# Patient Record
Sex: FEMALE | Race: WHITE | Employment: FULL TIME | URBAN - NONMETROPOLITAN AREA
[De-identification: names, ages, dates, MRNs, and addresses within clinical notes are randomized per-mention and may not be internally consistent; named-entity substitution may affect disease eponyms.]

---

## 2018-05-27 ENCOUNTER — HOSPITAL ENCOUNTER (EMERGENCY)
Facility: HOSPITAL | Age: 63
Discharge: HOME | End: 2018-05-27
Attending: PHYSICIAN ASSISTANT | Admitting: PHYSICIAN ASSISTANT
Payer: COMMERCIAL

## 2018-05-27 VITALS
TEMPERATURE: 97.6 F | RESPIRATION RATE: 16 BRPM | OXYGEN SATURATION: 96 % | DIASTOLIC BLOOD PRESSURE: 89 MMHG | HEIGHT: 62 IN | SYSTOLIC BLOOD PRESSURE: 147 MMHG

## 2018-05-27 DIAGNOSIS — S81.811A LACERATION OF RIGHT LOWER EXTREMITY, INITIAL ENCOUNTER: ICD-10-CM

## 2018-05-27 PROCEDURE — 40000268 ZZH STATISTIC NO CHARGES

## 2018-05-27 PROCEDURE — 12002 RPR S/N/AX/GEN/TRNK2.6-7.5CM: CPT | Performed by: PHYSICIAN ASSISTANT

## 2018-05-27 PROCEDURE — 12002 RPR S/N/AX/GEN/TRNK2.6-7.5CM: CPT

## 2018-05-27 RX ORDER — HYDROCHLOROTHIAZIDE 12.5 MG/1
12.5 CAPSULE ORAL DAILY
COMMUNITY

## 2018-05-27 RX ORDER — CEPHALEXIN 500 MG/1
500 CAPSULE ORAL 3 TIMES DAILY
Qty: 27 CAPSULE | Refills: 0 | Status: SHIPPED | OUTPATIENT
Start: 2018-05-27 | End: 2018-06-05

## 2018-05-27 RX ORDER — CEPHALEXIN 500 MG/1
500 CAPSULE ORAL ONCE
Status: COMPLETED | OUTPATIENT
Start: 2018-05-27 | End: 2018-05-27

## 2018-05-27 RX ORDER — TAMOXIFEN CITRATE 10 MG/1
10 TABLET ORAL 2 TIMES DAILY
COMMUNITY

## 2018-05-27 RX ADMIN — CEPHALEXIN 500 MG: 500 CAPSULE ORAL at 21:26

## 2018-05-27 ASSESSMENT — ENCOUNTER SYMPTOMS
WOUND: 1
CONSTITUTIONAL NEGATIVE: 1
NEUROLOGICAL NEGATIVE: 1
PSYCHIATRIC NEGATIVE: 1
CARDIOVASCULAR NEGATIVE: 1

## 2018-05-27 NOTE — ED AVS SNAPSHOT
[facility]    [address]    [facility] [address]    Phone:  [phone]                                       Sunitha Larkin   MRN: [id number]    Department:  [facility]   Date of Visit:  5/27/2018           After Visit Summary Signature Page     I have received my discharge instructions, and my questions have been answered. I have discussed any challenges I see with this plan with the nurse or doctor.    ..........................................................................................................................................  Patient/Patient Representative Signature      ..........................................................................................................................................  Patient Representative Print Name and Relationship to Patient    ..................................................               ................................................  Date                                            Time    ..........................................................................................................................................  Reviewed by Signature/Title    ...................................................              ..............................................  Date                                                            Time

## 2018-05-27 NOTE — ED AVS SNAPSHOT
[facility]    [address]    [facility] [address]    Phone:  [phone]                                       Sunitha Slade   MRN: [id number]    Department:  [facility]   Date of Visit:  5/27/2018           Patient Information     Date Of Birth          1955        Your diagnoses for this visit were:     Laceration of right lower extremity, initial encounter 12 sutures       You were seen by Shanita Nath PA.      Follow-up Information     Follow up with Cathy Johnson MD In 10 days.    Specialty:  Family Practice    Why:  For suture removal, sooner if redness/swelling develop    Contact information:    XXX NO INFO FOUND XXX  XXX  [place] [address]          Follow up with [facility].    Specialty:  EMERGENCY MEDICINE    Why:  If fever/further concerns develop    Contact information:    [address] [place] [id number] [phone]    Additional information:    From [place]: Take [address]. Turn left at [address]. Turn left at the first stoplight on [address]. At the first stop sign, take a right onto [address]. Take a left into the parking lot and continue through until you reach the North enterance of the building.       From [place]: Take [address]. Take the [address] ramp towards [place]. Turn left onto [address]. Take a slight right onto [address]. Turn left at the first stoplight on [address]. At the first stop sign, take a right onto [address]. Take a left into the parking lot and continue through until you reach the North enterance of the building.       From [place]: Take [address]. Take a right at [address]. At the first stop sign, take a right onto [address]. Take a left into the parking lot and continue through until you reach the North enterance of the building.       Discharge References/Attachments     LACERATION, ALL  "(ENGLISH)    WOUND CARE (ENGLISH)         Review of your medicines      START taking        Dose / Directions Last dose taken    cephALEXin 500 MG capsule   Commonly known as:  KEFLEX   Dose:  500 mg   Quantity:  27 capsule        Take 1 capsule (500 mg) by mouth 3 times daily for 9 days   Refills:  0          Our records show that you are taking the medicines listed below. If these are incorrect, please call your family doctor or clinic.        Dose / Directions Last dose taken    hydrochlorothiazide 12.5 MG capsule   Commonly known as:  MICROZIDE   Dose:  12.5 mg        Take 12.5 mg by mouth daily   Refills:  0        tamoxifen 10 MG tablet   Commonly known as:  NOLVADEX   Dose:  10 mg        Take 10 mg by mouth 2 times daily   Refills:  0                Prescriptions were sent or printed at these locations (1 Prescription)                   [id number] - [place], [place] - [address] AT [facility] [address]   [address], [place] [address]    Telephone:  [phone]   Fax:  [phone]   Hours:                  E-Prescribed (1 of 1)         cephALEXin (KEFLEX) 500 MG capsule                Orders Needing Specimen Collection     None      Pending Results     No orders found from 5/25/2018 to 5/28/2018.            Pending Culture Results     No orders found from 5/25/2018 to 5/28/2018.            Thank you for choosing [facility]       Thank you for choosing [facility] for your care. Our goal is always to provide you with excellent care. Hearing back from our patients is one way we can continue to improve our services. Please take a few minutes to complete the written survey that you may receive in the mail after you visit with us. Thank you!        [facility] Information     [facility] lets you send messages to your doctor, view your test results, renew your prescriptions, schedule appointments and more. To sign up, go to [email or web address] . Click on \"Log in\" on the left side of the screen, " "which will take you to the Welcome page. Then click on \"Sign up Now\" on the right side of the page.     You will be asked to enter the access code listed below, as well as some personal information. Please follow the directions to create your username and password.     Your access code is: [email or web address]  Expires: 2018  9:21 PM     Your access code will  in 90 days. If you need help or a new code, please call your [facility] clinic or [phone].        Care EveryWhere ID     This is your Care EveryWhere ID. This could be used by other organizations to access your [facility] medical records  [id number]        Equal Access to Services     PATRICIA GIBSON : Miguel Giron, kelsi keating, alin hodges, pia phillips. So [facility] [phone].    ATENCIÓN: Si habla español, tiene a verduzco disposición servicios gratuitos de asistencia lingüística. Llame al [phone].    We comply with applicable federal civil rights laws and [place] laws. We do not discriminate on the basis of race, color, national origin, age, disability, sex, sexual orientation, or gender identity.            After Visit Summary       This is your record. Keep this with you and show to your community pharmacist(s) and doctor(s) at your next visit.                  "

## 2018-05-28 NOTE — ED PROVIDER NOTES
"  History     Chief Complaint   Patient presents with     Laceration     Right leg while carrying trash out.     The history is provided by the patient and the spouse. No  was used.     Sunitha Larkin is a 62 year old female who just cut her right lower leg. She was taking the trsh out and when she was walking, the bag swung against her leg. There was a broken glass in the bag that cut her. Pt denies any other injury at this time.         Past Medical History:    History reviewed. No pertinent past medical history.    Past Surgical History:    No past surgical history on file.    Family History:    Not pertinent      Social History:  Marital Status:   [2]  Social History   Substance Use Topics     Smoking status: Not on file     Smokeless tobacco: Not on file     Alcohol use Not on file        Medications:      cephALEXin (KEFLEX) 500 MG capsule   hydrochlorothiazide (MICROZIDE) 12.5 MG capsule   tamoxifen (NOLVADEX) 10 MG tablet         Review of Systems   Constitutional: Negative.    HENT: Negative.    Cardiovascular: Negative.    Skin: Positive for wound.   Neurological: Negative.    Psychiatric/Behavioral: Negative.        Physical Exam   BP: 147/89  Heart Rate: 89  Temp: 97.6  F (36.4  C)  Resp: 16  Height: 157.5 cm (5' 2\")  SpO2: 96 %      Physical Exam   Constitutional: She is oriented to person, place, and time. She appears well-developed and well-nourished. No distress.   Cardiovascular: Normal rate.    Pulmonary/Chest: Effort normal.   Musculoskeletal:   Right lower leg: lateral side has a linear laceration. approx 5 cm long. Mild TTP. M/n/v intact. +AFROM, 5/5 strength. Minimal bleeding. No edema/erythema/ecchymosis   Neurological: She is alert and oriented to person, place, and time.   Skin: She is not diaphoretic.   Psychiatric: She has a normal mood and affect.   Nursing note and vitals reviewed.      ED Course     ED Course     Laceration repair  Date/Time: 5/27/2018 10:01 " PM  Performed by: SHIN FLOR  Authorized by: SHIN FLOR   Consent: Verbal consent obtained.  Consent given by: patient  Patient identity confirmed: verbally with patient and provided demographic data  Body area: lower extremity  Location details: right lower leg  Laceration length: 5 cm  Foreign bodies: no foreign bodies  Tendon involvement: none  Nerve involvement: none  Vascular damage: no  Anesthesia: local infiltration    Anesthesia:  Local Anesthetic: lidocaine 1% with epinephrine  Anesthetic total: 4 mL  Preparation: Patient was prepped and draped in the usual sterile fashion.  Irrigation solution: tap water (with hibiclense and then p. iodine)  Amount of cleaning: standard  Debridement: none  Degree of undermining: none  Skin closure: 5-0 nylon  Number of sutures: 12  Technique: simple  Approximation: close  Approximation difficulty: simple  Dressing: 4x4 sterile gauze and antibiotic ointment (coban)  Patient tolerance: Patient tolerated the procedure well with no immediate complications  Comments: Bleeding resolved                Medications   cephALEXin (KEFLEX) 3 capsule ed starter pack 500 mg (500 mg Oral Given 5/27/18 2126)       Assessments & Plan (with Medical Decision Making)     I have reviewed the nursing notes.    I have reviewed the findings, diagnosis, plan and need for follow up with the patient.      Discharge Medication List as of 5/27/2018  9:21 PM      START taking these medications    Details   cephALEXin (KEFLEX) 500 MG capsule Take 1 capsule (500 mg) by mouth 3 times daily for 9 days, Disp-27 capsule, R-0, E-Prescribe             Final diagnoses:   Laceration of right lower extremity, initial encounter - 12 sutures         Keep area clean and dry.  Educations sheet given  Pt educated and has no further questions.  Follow up with PCP if symptoms increase and in 10 days for suture removal.  Follow up the UC/ED if further concerns develop  5/27/2018   [facility]      Shanita Nath PA  05/27/18 2203

## 2018-05-28 NOTE — ED TRIAGE NOTES
Pt presents today with c/o leg laceration on right calf. Pt got cut on broken glass in a garbage bag today.

## 2018-05-28 NOTE — ED TRIAGE NOTES
Laceration right lower leg. Approx 4-6 inches, on brief triage exam looks through skin layer, no muscle involvement noted. Unknown last tetanus.